# Patient Record
Sex: FEMALE | Race: WHITE
[De-identification: names, ages, dates, MRNs, and addresses within clinical notes are randomized per-mention and may not be internally consistent; named-entity substitution may affect disease eponyms.]

---

## 2017-09-26 ENCOUNTER — HOSPITAL ENCOUNTER (OUTPATIENT)
Dept: HOSPITAL 53 - M LAB REF | Age: 42
End: 2017-09-26
Attending: INTERNAL MEDICINE
Payer: COMMERCIAL

## 2017-09-26 DIAGNOSIS — B37.3: Primary | ICD-10-CM

## 2017-11-17 ENCOUNTER — HOSPITAL ENCOUNTER (OUTPATIENT)
Dept: HOSPITAL 53 - M SFHCWAGY | Age: 42
End: 2017-11-17
Attending: FAMILY MEDICINE
Payer: COMMERCIAL

## 2017-11-17 DIAGNOSIS — N89.8: Primary | ICD-10-CM

## 2017-12-11 ENCOUNTER — HOSPITAL ENCOUNTER (OUTPATIENT)
Dept: HOSPITAL 53 - M WHC | Age: 42
End: 2017-12-11
Attending: NURSE PRACTITIONER
Payer: COMMERCIAL

## 2017-12-11 DIAGNOSIS — Z12.31: Primary | ICD-10-CM

## 2017-12-12 NOTE — REPMRS
Patient History

The patient states she had a clinical breast exam in 12/2017.

No known family history of cancer.

 

Digital Woman Screen Mammo: December 11, 2017 - Exam #: 

[id number]

Bilateral CC and MLO view(s) were taken.

 

Technologist: Merced Sinhaologist

FINDINGS: The breast tissue is heterogeneously dense.  This may 

lower the sensitivity of mammography.  There is no evidence of 

cancer on this mammogram.

 

ASSESSMENT: BI-RADS/ACR category 2 mammogram. Benign finding(s).

 

Recommendation

Routine screening mammogram of both breasts in 1 year (for women 

over age 40).

This mammogram was interpreted with the aid of an FDA-approved 

computer-aided dectection system.

 

Electronically Signed By: Bal Valdez MD 12/12/17 [id number]

## 2018-12-12 ENCOUNTER — HOSPITAL ENCOUNTER (OUTPATIENT)
Dept: HOSPITAL 53 - M WHC | Age: 43
End: 2018-12-12
Attending: NURSE PRACTITIONER
Payer: COMMERCIAL

## 2018-12-12 DIAGNOSIS — Z12.31: Primary | ICD-10-CM

## 2018-12-12 PROCEDURE — 77067 SCR MAMMO BI INCL CAD: CPT

## 2019-12-13 ENCOUNTER — HOSPITAL ENCOUNTER (OUTPATIENT)
Dept: HOSPITAL 53 - M WHC | Age: 44
End: 2019-12-13
Attending: NURSE PRACTITIONER
Payer: COMMERCIAL

## 2019-12-13 DIAGNOSIS — Z12.31: Primary | ICD-10-CM

## 2019-12-13 NOTE — REPMRS
Patient History

The patient states she had a clinical breast exam in Dec. 2019.

 

No known family history of cancer.

 

3D TOMOSYNTHESIS WAS PERFORMED.

 

[facility] for breast cancer [facility] 13.0%.

 

Digital Woman Screen Mammo: December 13, 2019 - Exam #: 

[id number]

Bilateral CC and MLO view(s) were taken.

 

Technologist: Mónica Chávez, Technologist

Prior study comparison: December 12, 2018, bilateral digital 

woman screen mammo performed at [facility] 

[facility].  December 11, 2017, digital woman screen mammo 

performed at [facility] [place] [facility].

 

FINDINGS: The breast tissue is heterogeneously dense.  This may 

lower the sensitivity of mammography.  There has been no change 

in the appearance of the mammogram from the prior studies.  There

is a moderate amount of residual fibroglandular tissue which is 

fairly symmetric. There is no interval development of dominant 

mass, areas of architectural distortion, or clustered 

microcalcification typical of malignancy.

 

Assessment: BI-RADS/ACR category 1 mammogram. Negative Mammogram.

 

Recommendation

Routine screening mammogram in 1 year (for women over age 40).

This mammogram was interpreted with the aid of an FDA-approved 

computer-aided dectection system.

 

Electronically Signed By: Bal Valdez MD 12/13/19 [id number]

## 2021-08-18 ENCOUNTER — HOSPITAL ENCOUNTER (OUTPATIENT)
Dept: HOSPITAL 53 - M WHC | Age: 46
End: 2021-08-18
Attending: ADVANCED PRACTICE MIDWIFE
Payer: COMMERCIAL

## 2021-08-18 ENCOUNTER — HOSPITAL ENCOUNTER (OUTPATIENT)
Dept: HOSPITAL 53 - M SFHCWAGY | Age: 46
End: 2021-08-18
Attending: ADVANCED PRACTICE MIDWIFE
Payer: COMMERCIAL

## 2021-08-18 DIAGNOSIS — Z12.4: Primary | ICD-10-CM

## 2021-08-18 DIAGNOSIS — Z12.31: Primary | ICD-10-CM

## 2021-08-18 DIAGNOSIS — Z01.419: ICD-10-CM

## 2021-08-18 PROCEDURE — 87624 HPV HI-RISK TYP POOLED RSLT: CPT

## 2021-08-18 NOTE — REP
INDICATION:

SCR MAMMO.



COMPARISON:

Multiple



TECHNIQUE:

Digital screening mammography was carried out bilaterally in the CC and MLO

projections using both 2D and 3D modalities and compared to the prior exams.  By

history, the patient has no complaints of a palpable breast abnormality or other

significant breast complaints.



FINDINGS:

The breasts are unchanged in size and shape.  Once again, dense heterogenous nodular

fibroglandular elements are seen bilaterally in a stable appearing pattern but to such

a degree that the sensitivity of the mammogram detecting cancer is decreased.  There

are no charles soft tissue densities or spiculated masses.  There is no internal

architectural distortion.  There are no suspicious calcifications.  There is no skin

thickening or nipple retraction.



The Volpara volumetric breast density pattern is C.



IMPRESSION:

BIRADS/ACR category 2 benign findings.  There is no evidence of malignant alteration

of the breasts..  Due to the patient's breast density score bilateral whole breast

screening ultrasonography is warranted.



This patient's Tyrer-Cuzick lifetime breast cancer risk assessment score is 12.8%.



This mammogram was interpreted with the aid of an FDA-approved computer-aided

detection system.



The patient states she had a clinical breast exam in August 2021.



The patient letter being requested is M1.



RECOMMENDATION:

Repeat screening mammography recommended 1 year (for women over 40).  Additional

recommendation as described above.





<Electronically signed by Zion Nicole > 08/18/21 [id number]

## 2022-02-21 ENCOUNTER — HOSPITAL ENCOUNTER (OUTPATIENT)
Dept: HOSPITAL 53 - M LAB REF | Age: 47
End: 2022-02-21
Attending: INTERNAL MEDICINE
Payer: COMMERCIAL

## 2022-02-21 DIAGNOSIS — E16.2: Primary | ICD-10-CM

## 2023-02-16 ENCOUNTER — HOSPITAL ENCOUNTER (OUTPATIENT)
Dept: HOSPITAL 53 - M LAB REF | Age: 48
End: 2023-02-16
Attending: INTERNAL MEDICINE
Payer: COMMERCIAL

## 2023-02-16 DIAGNOSIS — D51.9: Primary | ICD-10-CM
